# Patient Record
Sex: MALE | Race: WHITE | Employment: FULL TIME | URBAN - METROPOLITAN AREA
[De-identification: names, ages, dates, MRNs, and addresses within clinical notes are randomized per-mention and may not be internally consistent; named-entity substitution may affect disease eponyms.]

---

## 2017-06-28 ENCOUNTER — OFFICE VISIT (OUTPATIENT)
Dept: INTERNAL MEDICINE | Age: 32
End: 2017-06-28

## 2017-06-28 ENCOUNTER — HOSPITAL ENCOUNTER (OUTPATIENT)
Dept: CT IMAGING | Age: 32
Discharge: HOME | End: 2017-06-28
Attending: INTERNAL MEDICINE | Admitting: INTERNAL MEDICINE

## 2017-06-28 VITALS
WEIGHT: 270 LBS | BODY MASS INDEX: 33.57 KG/M2 | DIASTOLIC BLOOD PRESSURE: 80 MMHG | SYSTOLIC BLOOD PRESSURE: 144 MMHG | HEIGHT: 75 IN

## 2017-06-28 DIAGNOSIS — R51.9 ACUTE INTRACTABLE HEADACHE, UNSPECIFIED HEADACHE TYPE: ICD-10-CM

## 2017-06-28 DIAGNOSIS — R51.9 HEADACHE: ICD-10-CM

## 2017-06-28 DIAGNOSIS — R51.9 ACUTE INTRACTABLE HEADACHE, UNSPECIFIED HEADACHE TYPE: Primary | ICD-10-CM

## 2017-06-28 PROCEDURE — 99213 OFFICE O/P EST LOW 20 MIN: CPT | Performed by: INTERNAL MEDICINE

## 2017-06-28 RX ORDER — ONDANSETRON 4 MG/1
4 TABLET, FILM COATED ORAL DAILY PRN
Qty: 20 TABLET | Refills: 1 | Status: SHIPPED | OUTPATIENT
Start: 2017-06-28 | End: 2018-09-06 | Stop reason: ALTCHOICE

## 2017-06-28 RX ORDER — HYDROCODONE BITARTRATE AND ACETAMINOPHEN 5; 325 MG/1; MG/1
1 TABLET ORAL EVERY 6 HOURS PRN
Qty: 20 TABLET | Refills: 0 | Status: SHIPPED | OUTPATIENT
Start: 2017-06-28 | End: 2017-07-05

## 2017-06-28 RX ORDER — BUTALBITAL, ACETAMINOPHEN AND CAFFEINE 50; 325; 40 MG/1; MG/1; MG/1
1 TABLET ORAL EVERY 4 HOURS PRN
Qty: 30 TABLET | Refills: 0 | Status: SHIPPED | OUTPATIENT
Start: 2017-06-28 | End: 2018-09-06 | Stop reason: ALTCHOICE

## 2017-07-09 ASSESSMENT — ENCOUNTER SYMPTOMS
RESPIRATORY NEGATIVE: 1
ABDOMINAL PAIN: 0
SHORTNESS OF BREATH: 0
CHEST TIGHTNESS: 0

## 2018-09-06 ENCOUNTER — OFFICE VISIT (OUTPATIENT)
Dept: INTERNAL MEDICINE | Age: 33
End: 2018-09-06

## 2018-09-06 VITALS
WEIGHT: 275 LBS | BODY MASS INDEX: 34.19 KG/M2 | DIASTOLIC BLOOD PRESSURE: 70 MMHG | HEIGHT: 75 IN | SYSTOLIC BLOOD PRESSURE: 110 MMHG

## 2018-09-06 DIAGNOSIS — M54.50 ACUTE BILATERAL LOW BACK PAIN WITHOUT SCIATICA: Primary | ICD-10-CM

## 2018-09-06 DIAGNOSIS — F17.210 TOBACCO DEPENDENCE DUE TO CIGARETTES: ICD-10-CM

## 2018-09-06 PROCEDURE — 99213 OFFICE O/P EST LOW 20 MIN: CPT | Performed by: INTERNAL MEDICINE

## 2018-09-06 RX ORDER — VARENICLINE TARTRATE 25 MG
KIT ORAL
Qty: 1 EACH | Refills: 0 | Status: SHIPPED | OUTPATIENT
Start: 2018-09-06 | End: 2020-05-04

## 2018-09-06 RX ORDER — VARENICLINE TARTRATE 1 MG/1
1 TABLET, FILM COATED ORAL 2 TIMES DAILY
Qty: 60 TABLET | Refills: 3 | Status: SHIPPED | OUTPATIENT
Start: 2018-09-06 | End: 2020-05-04

## 2018-09-06 ASSESSMENT — ENCOUNTER SYMPTOMS
WHEEZING: 0
DIARRHEA: 0
CONSTIPATION: 0
SHORTNESS OF BREATH: 0
BACK PAIN: 1

## 2018-09-06 ASSESSMENT — PATIENT HEALTH QUESTIONNAIRE - PHQ9
SUM OF ALL RESPONSES TO PHQ QUESTIONS 1-9: 0
SUM OF ALL RESPONSES TO PHQ QUESTIONS 1-9: 0
1. LITTLE INTEREST OR PLEASURE IN DOING THINGS: 0
2. FEELING DOWN, DEPRESSED OR HOPELESS: 0
SUM OF ALL RESPONSES TO PHQ9 QUESTIONS 1 & 2: 0

## 2018-09-06 NOTE — PATIENT INSTRUCTIONS
Can take ibuprofen 600mg-800mg up to 3x/day. For the higher dose, limit to a couple weeks at most.     Patient Education        Low Back Pain: Exercises  Your Care Instructions  Here are some examples of typical rehabilitation exercises for your condition. Start each exercise slowly. Ease off the exercise if you start to have pain. Your doctor or physical therapist will tell you when you can start these exercises and which ones will work best for you. How to do the exercises  Press-up    1. Lie on your stomach, supporting your body with your forearms. 2. Press your elbows down into the floor to raise your upper back. As you do this, relax your stomach muscles and allow your back to arch without using your back muscles. As your press up, do not let your hips or pelvis come off the floor. 3. Hold for 15 to 30 seconds, then relax. 4. Repeat 2 to 4 times. Alternate arm and leg (bird dog) exercise    Do this exercise slowly. Try to keep your body straight at all times, and do not let one hip drop lower than the other. 1. Start on the floor, on your hands and knees. 2. Tighten your belly muscles. 3. Raise one leg off the floor, and hold it straight out behind you. Be careful not to let your hip drop down, because that will twist your trunk. 4. Hold for about 6 seconds, then lower your leg and switch to the other leg. 5. Repeat 8 to 12 times on each leg. 6. Over time, work up to holding for 10 to 30 seconds each time. 7. If you feel stable and secure with your leg raised, try raising the opposite arm straight out in front of you at the same time. Knee-to-chest exercise    1. Lie on your back with your knees bent and your feet flat on the floor. 2. Bring one knee to your chest, keeping the other foot flat on the floor (or keeping the other leg straight, whichever feels better on your lower back). 3. Keep your lower back pressed to the floor. Hold for at least 15 to 30 seconds.   4. Relax, and lower the knee

## 2019-04-24 ENCOUNTER — APPOINTMENT (OUTPATIENT)
Dept: ULTRASOUND IMAGING | Age: 34
End: 2019-04-24
Payer: COMMERCIAL

## 2019-04-24 ENCOUNTER — OFFICE VISIT (OUTPATIENT)
Dept: INTERNAL MEDICINE CLINIC | Age: 34
End: 2019-04-24
Payer: COMMERCIAL

## 2019-04-24 ENCOUNTER — HOSPITAL ENCOUNTER (EMERGENCY)
Age: 34
Discharge: HOME | End: 2019-04-24
Attending: EMERGENCY MEDICINE
Payer: COMMERCIAL

## 2019-04-24 VITALS
OXYGEN SATURATION: 98 % | WEIGHT: 280 LBS | RESPIRATION RATE: 20 BRPM | HEIGHT: 75 IN | BODY MASS INDEX: 34.82 KG/M2 | SYSTOLIC BLOOD PRESSURE: 130 MMHG | TEMPERATURE: 97.8 F | DIASTOLIC BLOOD PRESSURE: 79 MMHG | HEART RATE: 79 BPM

## 2019-04-24 VITALS
DIASTOLIC BLOOD PRESSURE: 90 MMHG | BODY MASS INDEX: 34.19 KG/M2 | HEIGHT: 75 IN | WEIGHT: 275 LBS | HEART RATE: 87 BPM | SYSTOLIC BLOOD PRESSURE: 144 MMHG | OXYGEN SATURATION: 97 %

## 2019-04-24 DIAGNOSIS — N50.3 EPIDIDYMAL CYST: Primary | ICD-10-CM

## 2019-04-24 DIAGNOSIS — N43.3 HYDROCELE, UNSPECIFIED HYDROCELE TYPE: ICD-10-CM

## 2019-04-24 DIAGNOSIS — N50.811 TESTICULAR PAIN, RIGHT: ICD-10-CM

## 2019-04-24 DIAGNOSIS — N50.811 RIGHT TESTICULAR PAIN: Primary | ICD-10-CM

## 2019-04-24 LAB
ANION GAP SERPL CALCULATED.3IONS-SCNC: 15 MMOL/L (ref 3–16)
BASOPHILS ABSOLUTE: 0 K/UL (ref 0–0.2)
BASOPHILS RELATIVE PERCENT: 0.3 %
BILIRUBIN URINE: NEGATIVE
BLOOD, URINE: NEGATIVE
BUN BLDV-MCNC: 9 MG/DL (ref 7–20)
CALCIUM SERPL-MCNC: 9.7 MG/DL (ref 8.3–10.6)
CHLORIDE BLD-SCNC: 100 MMOL/L (ref 99–110)
CLARITY: CLEAR
CO2: 24 MMOL/L (ref 21–32)
COLOR: YELLOW
CREAT SERPL-MCNC: 0.8 MG/DL (ref 0.9–1.3)
EOSINOPHILS ABSOLUTE: 0.6 K/UL (ref 0–0.6)
EOSINOPHILS RELATIVE PERCENT: 6.4 %
GFR AFRICAN AMERICAN: >60
GFR NON-AFRICAN AMERICAN: >60
GLUCOSE BLD-MCNC: 105 MG/DL (ref 70–99)
GLUCOSE URINE: NEGATIVE MG/DL
HCT VFR BLD CALC: 45.8 % (ref 40.5–52.5)
HEMOGLOBIN: 15.8 G/DL (ref 13.5–17.5)
KETONES, URINE: NEGATIVE MG/DL
LEUKOCYTE ESTERASE, URINE: NEGATIVE
LYMPHOCYTES ABSOLUTE: 2.4 K/UL (ref 1–5.1)
LYMPHOCYTES RELATIVE PERCENT: 25.5 %
MCH RBC QN AUTO: 31.1 PG (ref 26–34)
MCHC RBC AUTO-ENTMCNC: 34.5 G/DL (ref 31–36)
MCV RBC AUTO: 90.2 FL (ref 80–100)
MICROSCOPIC EXAMINATION: NORMAL
MONOCYTES ABSOLUTE: 0.6 K/UL (ref 0–1.3)
MONOCYTES RELATIVE PERCENT: 6.5 %
NEUTROPHILS ABSOLUTE: 5.8 K/UL (ref 1.7–7.7)
NEUTROPHILS RELATIVE PERCENT: 61.3 %
NITRITE, URINE: NEGATIVE
PDW BLD-RTO: 12.6 % (ref 12.4–15.4)
PH UA: 6.5 (ref 5–8)
PLATELET # BLD: 250 K/UL (ref 135–450)
PMV BLD AUTO: 9.2 FL (ref 5–10.5)
POTASSIUM REFLEX MAGNESIUM: 4.3 MMOL/L (ref 3.5–5.1)
PROTEIN UA: NEGATIVE MG/DL
RBC # BLD: 5.08 M/UL (ref 4.2–5.9)
SODIUM BLD-SCNC: 139 MMOL/L (ref 136–145)
SPECIFIC GRAVITY UA: <=1.005 (ref 1–1.03)
URINE TYPE: NORMAL
UROBILINOGEN, URINE: 0.2 E.U./DL
WBC # BLD: 9.5 K/UL (ref 4–11)

## 2019-04-24 PROCEDURE — 96374 THER/PROPH/DIAG INJ IV PUSH: CPT

## 2019-04-24 PROCEDURE — 81003 URINALYSIS AUTO W/O SCOPE: CPT

## 2019-04-24 PROCEDURE — 85025 COMPLETE CBC W/AUTO DIFF WBC: CPT

## 2019-04-24 PROCEDURE — 93975 VASCULAR STUDY: CPT

## 2019-04-24 PROCEDURE — 6360000002 HC RX W HCPCS: Performed by: PHYSICIAN ASSISTANT

## 2019-04-24 PROCEDURE — 99284 EMERGENCY DEPT VISIT MOD MDM: CPT

## 2019-04-24 PROCEDURE — 80048 BASIC METABOLIC PNL TOTAL CA: CPT

## 2019-04-24 PROCEDURE — 76870 US EXAM SCROTUM: CPT

## 2019-04-24 PROCEDURE — 96375 TX/PRO/DX INJ NEW DRUG ADDON: CPT

## 2019-04-24 PROCEDURE — 99213 OFFICE O/P EST LOW 20 MIN: CPT | Performed by: NURSE PRACTITIONER

## 2019-04-24 PROCEDURE — 6360000002 HC RX W HCPCS

## 2019-04-24 PROCEDURE — 36415 COLL VENOUS BLD VENIPUNCTURE: CPT

## 2019-04-24 RX ORDER — FENTANYL CITRATE 50 UG/ML
50 INJECTION, SOLUTION INTRAMUSCULAR; INTRAVENOUS ONCE
Status: COMPLETED | OUTPATIENT
Start: 2019-04-24 | End: 2019-04-24

## 2019-04-24 RX ORDER — IBUPROFEN 800 MG/1
800 TABLET ORAL EVERY 8 HOURS PRN
Qty: 30 TABLET | Refills: 0 | Status: SHIPPED | OUTPATIENT
Start: 2019-04-24

## 2019-04-24 RX ORDER — ONDANSETRON 2 MG/ML
4 INJECTION INTRAMUSCULAR; INTRAVENOUS ONCE
Status: DISCONTINUED | OUTPATIENT
Start: 2019-04-24 | End: 2019-04-24

## 2019-04-24 RX ORDER — FENTANYL CITRATE 50 UG/ML
INJECTION, SOLUTION INTRAMUSCULAR; INTRAVENOUS
Status: COMPLETED
Start: 2019-04-24 | End: 2019-04-24

## 2019-04-24 RX ORDER — KETOROLAC TROMETHAMINE 30 MG/ML
15 INJECTION, SOLUTION INTRAMUSCULAR; INTRAVENOUS ONCE
Status: COMPLETED | OUTPATIENT
Start: 2019-04-24 | End: 2019-04-24

## 2019-04-24 RX ORDER — HYDROCODONE BITARTRATE AND ACETAMINOPHEN 5; 325 MG/1; MG/1
1 TABLET ORAL EVERY 6 HOURS PRN
Qty: 10 TABLET | Refills: 0 | Status: SHIPPED | OUTPATIENT
Start: 2019-04-24 | End: 2019-04-27

## 2019-04-24 RX ADMIN — FENTANYL CITRATE 50 MCG: 50 INJECTION INTRAMUSCULAR; INTRAVENOUS at 11:51

## 2019-04-24 RX ADMIN — KETOROLAC TROMETHAMINE 15 MG: 30 INJECTION, SOLUTION INTRAMUSCULAR at 11:15

## 2019-04-24 RX ADMIN — FENTANYL CITRATE 50 MCG: 50 INJECTION, SOLUTION INTRAMUSCULAR; INTRAVENOUS at 11:51

## 2019-04-24 ASSESSMENT — PAIN DESCRIPTION - DESCRIPTORS: DESCRIPTORS: PRESSURE

## 2019-04-24 ASSESSMENT — PAIN DESCRIPTION - PAIN TYPE: TYPE: ACUTE PAIN

## 2019-04-24 ASSESSMENT — ENCOUNTER SYMPTOMS
SHORTNESS OF BREATH: 0
WHEEZING: 0
DIARRHEA: 0
COUGH: 0
ABDOMINAL PAIN: 0
VOMITING: 0
CHEST TIGHTNESS: 0
WHEEZING: 0
NAUSEA: 0
BACK PAIN: 0
COUGH: 0
FACIAL SWELLING: 0
SORE THROAT: 0
TROUBLE SWALLOWING: 0
SHORTNESS OF BREATH: 0

## 2019-04-24 ASSESSMENT — PAIN SCALES - GENERAL
PAINLEVEL_OUTOF10: 8
PAINLEVEL_OUTOF10: 8
PAINLEVEL_OUTOF10: 4
PAINLEVEL_OUTOF10: 8

## 2019-04-24 ASSESSMENT — PAIN DESCRIPTION - FREQUENCY: FREQUENCY: CONTINUOUS

## 2019-04-24 ASSESSMENT — PAIN DESCRIPTION - PROGRESSION: CLINICAL_PROGRESSION: GRADUALLY WORSENING

## 2019-04-24 ASSESSMENT — PAIN DESCRIPTION - LOCATION: LOCATION: ABDOMEN

## 2019-04-24 ASSESSMENT — PAIN DESCRIPTION - ORIENTATION: ORIENTATION: RIGHT

## 2019-04-24 NOTE — ASSESSMENT & PLAN NOTE
Pain started yesterday afternoon  Pain is affecting gait   Right teste is swollen and tender   Referred to ED

## 2019-04-24 NOTE — ED PROVIDER NOTES
surgery (1999). His family history includes Diabetes in his maternal grandmother. He reports that he has been smoking cigarettes. He has been smoking about 1.00 pack per day. He has quit using smokeless tobacco. His smokeless tobacco use included chew. He reports that he drinks alcohol. He reports that he does not use drugs. Medications     Previous Medications    VARENICLINE (CHANTIX CONTINUING MONTH PAK) 1 MG TABLET    Take 1 tablet by mouth 2 times daily    VARENICLINE (CHANTIX STARTING MONTH PAK) 0.5 MG X 11 & 1 MG X 42 TABLET    Take by mouth. Allergies     He is allergic to morphine. Physical Exam     INITIAL VITALS: BP: (!) 149/93, Temp: 97.8 °F (36.6 °C), Pulse: 87, Resp: 18, SpO2: 98 %  Physical Exam   Constitutional: He is oriented to person, place, and time. He appears well-developed and well-nourished. No distress. HENT:   Head: Normocephalic and atraumatic. Right Ear: External ear normal.   Left Ear: External ear normal.   Nose: Nose normal.   Mouth/Throat: Oropharynx is clear and moist.   Eyes: Conjunctivae are normal.   Neck: Normal range of motion. Neck supple. No tracheal deviation present. Cardiovascular: Normal rate, regular rhythm, normal heart sounds and intact distal pulses. Pulmonary/Chest: Effort normal and breath sounds normal. He exhibits no tenderness. Abdominal: Soft. Bowel sounds are normal. He exhibits no distension and no mass. There is no tenderness. There is no rebound, no guarding and no CVA tenderness. No hernia. Hernia confirmed negative in the right inguinal area and confirmed negative in the left inguinal area. Genitourinary: Penis normal. Right testis shows swelling and tenderness. Right testis shows no mass. Left testis shows no mass, no swelling and no tenderness. Circumcised. No penile erythema or penile tenderness. No discharge found. Genitourinary Comments: Right testicle swelling compared to the left.   He does have some tenderness over the epididymis. No other masses palpated. Musculoskeletal: Normal range of motion. He exhibits no edema. Lymphadenopathy:     He has no cervical adenopathy. No inguinal adenopathy noted on the right or left side. Neurological: He is alert and oriented to person, place, and time. Skin: Skin is warm and dry. No rash noted. He is not diaphoretic. Nursing note and vitals reviewed. Diagnostic Results     RADIOLOGY:  US SCROTUM AND TESTICLES   Final Result      Small minimally complex hydroceles bilaterally greater on the right. 7 mm epididymal cyst on the right. US DUP ABD PEL RETRO SCROT COMPLETE   Final Result      Small minimally complex hydroceles bilaterally greater on the right. 7 mm epididymal cyst on the right.           LABS:   Results for orders placed or performed during the hospital encounter of 04/24/19   Urinalysis, reflex to microscopic (Lab)   Result Value Ref Range    Color, UA Yellow Straw/Yellow    Clarity, UA Clear Clear    Glucose, Ur Negative Negative mg/dL    Bilirubin Urine Negative Negative    Ketones, Urine Negative Negative mg/dL    Specific Gravity, UA <=1.005 1.005 - 1.030    Blood, Urine Negative Negative    pH, UA 6.5 5.0 - 8.0    Protein, UA Negative Negative mg/dL    Urobilinogen, Urine 0.2 <2.0 E.U./dL    Nitrite, Urine Negative Negative    Leukocyte Esterase, Urine Negative Negative    Microscopic Examination Not Indicated     Urine Type Not Specified    CBC Auto Differential   Result Value Ref Range    WBC 9.5 4.0 - 11.0 K/uL    RBC 5.08 4.20 - 5.90 M/uL    Hemoglobin 15.8 13.5 - 17.5 g/dL    Hematocrit 45.8 40.5 - 52.5 %    MCV 90.2 80.0 - 100.0 fL    MCH 31.1 26.0 - 34.0 pg    MCHC 34.5 31.0 - 36.0 g/dL    RDW 12.6 12.4 - 15.4 %    Platelets 615 653 - 577 K/uL    MPV 9.2 5.0 - 10.5 fL    Neutrophils % 61.3 %    Lymphocytes % 25.5 %    Monocytes % 6.5 %    Eosinophils % 6.4 %    Basophils % 0.3 %    Neutrophils # 5.8 1.7 - 7.7 K/uL    Lymphocytes # 2.4 1.0 - is within normal limits with glucose of 105 and creatinine 0.8. Urinalysis was negative. Scrotal and testicle ultrasound showed small minimally complex hydroceles bilaterally greater on the right. He also has a 7 mm epididymal cyst on the right. Normal flow to both testicles. Patient will be referred to urology. He'll be prescribed ibuprofen 800 as well as a Norco for pain. He is to do scrotal support. If increased pain, nausea, vomiting, fevers, abdominal pain or back pain or thinks symptoms [facility] emergency department. At this point is stable for discharge. This patient was also evaluated by the attending physician. All care plans were discussed and agreed upon. Clinical Impression     1. Epididymal cyst    2. Hydrocele, unspecified hydrocele type        Disposition     PATIENT REFERRED TO:  [facility] Emergency Department  [address] [facility]    If symptoms worsen    Alex Hi MD  [address] [address] [facility]          Ruddy Aj  [facility]  [address] [place] [address]  [phone]    Schedule an appointment as soon as possible for a visit   with urology      DISCHARGE MEDICATIONS:  New Prescriptions    HYDROCODONE-ACETAMINOPHEN (NORCO) 5-325 MG PER TABLET    Take 1 tablet by mouth every 6 hours as needed for Pain for up to 3 days.     IBUPROFEN (ADVIL;MOTRIN) 800 MG TABLET    Take 1 tablet by mouth every 8 hours as needed for Pain (Take with food)       DISPOSITION   discharged     [place]  04/24/19 1320

## 2019-04-24 NOTE — PROGRESS NOTES
Subjective:      Patient ID: Zeinab Charlton is a 29 y.o. male. Chief Complaint   Patient presents with    Groin Pain     started yesterday afternoon. HPI   Shailesh Alicea is here for right groin pain and scrotal pain that started yesterday afternoon. He states his testicular pain is worse than his groin pain. He has not tried taking anything for his pain. Pain is gradually worsening. Pain is making it difficult to walk. Review of Systems   Constitutional: Negative for chills, fatigue and fever. Respiratory: Negative for cough, shortness of breath and wheezing. Cardiovascular: Negative for chest pain and palpitations. Genitourinary: Positive for scrotal swelling and testicular pain. Negative for discharge, dysuria, frequency, penile pain and urgency. Musculoskeletal: Negative for arthralgias and myalgias. Neurological: Negative for dizziness, weakness, light-headedness and headaches. Hematological: Negative for adenopathy. Objective:   Physical Exam   Constitutional: He is oriented to person, place, and time. He appears well-developed and well-nourished. HENT:   Head: Normocephalic and atraumatic. Cardiovascular: Normal rate, regular rhythm and normal heart sounds. Pulmonary/Chest: Effort normal and breath sounds normal.   Abdominal: Hernia confirmed negative in the right inguinal area and confirmed negative in the left inguinal area. Genitourinary: Penis normal. Right testis shows swelling and tenderness. Right testis shows no mass. Left testis shows no mass, no swelling and no tenderness. Musculoskeletal: Normal range of motion. Neurological: He is alert and oriented to person, place, and time. Skin: Skin is warm and dry. Capillary refill takes less than 2 seconds. Psychiatric: He has a normal mood and affect.  His behavior is normal.       Assessment:      See Problem List assessment and plan       Plan:       Testicular pain, right  Pain started yesterday afternoon  Pain is affecting gait   Right teste is swollen and tender   Referred to ED        Patient engaged in shared decision making. Information given to evaluate options of treatment, understand what is needed and discuss importance of following plan.

## 2019-04-24 NOTE — PATIENT INSTRUCTIONS
Patient Education        Groin Strain: Care Instructions  Your Care Instructions  A groin strain is an injury that happens when you tear or overstretch (pull) a groin muscle. The groin muscles are in the area on either side of the body in the folds where the belly joins the legs. You can strain a groin muscle during exercise, such as running, skating, kicking in soccer, or playing basketball. It can happen when you lift, push, or pull heavy objects. You might pull a groin muscle when you fall. The injury can range from a minor pull to a more serious tear of the muscle. You may feel pain and tenderness that's worse when you squeeze your legs together. You may also have pain when you raise the knee of the injured side. There may be swelling or bruising in the groin area or inner thigh. If you have a bad strain, you may walk with a limp while it heals. Rest and other home care can help the muscle heal. Healing can take up to 3 weeks or more. Your doctor may want to see you again in 2 to 3 weeks. Follow-up care is a key part of your treatment and safety. Be sure to make and go to all appointments, and call your doctor if you are having problems. It's also a good idea to know your test results and keep a list of the medicines you take. How can you care for yourself at home? · Be safe with medicines. Read and follow all instructions on the label. ? If the doctor gave you a prescription medicine for pain, take it as prescribed. ? If you are not taking a prescription pain medicine, ask your doctor if you can take an over-the-counter medicine. · Rest and protect your injured or sore groin area for 1 to 2 weeks. Stop, change, or take a break from any activity that may be causing your pain or soreness. Do not do intense activities while you still have pain. · Put ice or a cold pack on your groin area for 10 to 20 minutes at a time.  Try to do this every 1 to 2 hours for the next 3 days (when you are awake) or until the swelling goes down. Put a thin cloth between the ice and your skin. · After 2 or 3 days, if your swelling is gone, apply heat. Put a warm water bottle, a heating pad set on low, or a warm cloth on your groin area. Do not go to sleep with a heating pad on your skin. · If your doctor gave you crutches, make sure you use them as directed. · Wear snug shorts or underwear that support the injured area. When should you call for help? Call your doctor now or seek immediate medical care if:    · You have new or severe pain or swelling in the groin area.     · Your groin or upper thigh is cool or pale or changes color.     · You have tingling, weakness, or numbness in your groin or leg.     · You cannot move your leg.     · You cannot put weight on your leg.    Watch closely for changes in your health, and be sure to contact your doctor if:    · You do not get better as expected. Where can you learn more? Go to [email or web address] org and sign in to your [employer] account. Enter R702 in the [employer] box to learn more about \"Groin Strain: Care Instructions. \"     If you do not have an account, please click on the \"Sign Up Now\" link. Current as of: September 20, 2018  Content Version: 11.9  © 5100-6216 [employer]. Care instructions adapted under license by [facility] ([facility]). If you have questions about a medical condition or this instruction, always ask your healthcare professional. [place] [address] any warranty or liability for your use of this information.

## 2019-04-24 NOTE — ED PROVIDER NOTES
ED Attending Attestation Note     Date of evaluation: 4/24/2019    This patient was seen by the advance practice provider. I have seen and examined the patient, agree with the workup, evaluation, management and diagnosis. The care plan has been discussed. My assessment reveals a well appearing 29year old male with a benign abdominal examination. On my evaluation he has no significant swelling or discoloration to his testicles. No evidence of hernia. Some mild tenderness to his right testicle. I have low suspicion for torsion. Sexually active with no discharge. Same sexual partner for the past 8 years. Will obtain US to evaluate.        Lucia Rodriguez MD  04/24/19 9123

## 2019-04-29 ENCOUNTER — TELEPHONE (OUTPATIENT)
Dept: INTERNAL MEDICINE CLINIC | Age: 34
End: 2019-04-29

## 2019-04-29 ENCOUNTER — OFFICE VISIT (OUTPATIENT)
Dept: INTERNAL MEDICINE CLINIC | Age: 34
End: 2019-04-29
Payer: COMMERCIAL

## 2019-04-29 VITALS
HEIGHT: 75 IN | DIASTOLIC BLOOD PRESSURE: 70 MMHG | WEIGHT: 280 LBS | SYSTOLIC BLOOD PRESSURE: 138 MMHG | BODY MASS INDEX: 34.82 KG/M2

## 2019-04-29 DIAGNOSIS — R10.31 SEVERE RIGHT GROIN PAIN: Primary | ICD-10-CM

## 2019-04-29 PROCEDURE — 99213 OFFICE O/P EST LOW 20 MIN: CPT | Performed by: INTERNAL MEDICINE

## 2019-04-29 RX ORDER — HYDROCODONE BITARTRATE AND ACETAMINOPHEN 5; 325 MG/1; MG/1
1 TABLET ORAL EVERY 6 HOURS PRN
Qty: 28 TABLET | Refills: 0 | Status: SHIPPED | OUTPATIENT
Start: 2019-04-29 | End: 2019-05-06

## 2019-04-29 ASSESSMENT — PATIENT HEALTH QUESTIONNAIRE - PHQ9
SUM OF ALL RESPONSES TO PHQ9 QUESTIONS 1 & 2: 0
SUM OF ALL RESPONSES TO PHQ QUESTIONS 1-9: 0
1. LITTLE INTEREST OR PLEASURE IN DOING THINGS: 0
SUM OF ALL RESPONSES TO PHQ QUESTIONS 1-9: 0
2. FEELING DOWN, DEPRESSED OR HOPELESS: 0

## 2019-04-29 NOTE — PROGRESS NOTES
2019     Susan Polanco (:  1985) is a 29 y.o. male, here for evaluation of the following medical concerns:    Chief Complaint   Patient presents with    Groin Pain     onset 1 week, saw NP upstairs that sent him to ER, stated two cysts        HPI    Right groin pain - Started last week. Friday started feeling better, then was more active and started to get worse. Thinks when his heart rate is high pain is worse. Better Saturday, worse on , no clear pattern. No activity that seems to exacerbate the symptoms. Swelling is about the same. Taking ibuprofen and Vicodin, ibuprofen doesn't seem to do anything and Vicodin only seems to help a little. No bowel changes. No fevers/chills, redness. ED workup was unremarkable, included CBC, testicular ultrasound with doppler. Only found small epididymal cysts. Review of Systems   Genitourinary: Positive for testicular pain. Prior to Visit Medications    Medication Sig Taking? Authorizing Provider   HYDROcodone-acetaminophen (NORCO) 5-325 MG per tablet Take 1 tablet by mouth every 6 hours as needed for Pain for up to 7 days. Intended supply: 7 days. Take lowest dose possible to manage pain Yes Alice Ochoa MD   ibuprofen (ADVIL;MOTRIN) 800 MG tablet Take 1 tablet by mouth every 8 hours as needed for Pain (Take with food) Yes KATLYN Schmidt   varenicline (CHANTIX STARTING MONTH ROSIE) 0.5 MG X 11 & 1 MG X 42 tablet Take by mouth.  Yes Alice Ochoa MD   varenicline (CHANTIX CONTINUING MONTH ROSIE) 1 MG tablet Take 1 tablet by mouth 2 times daily Yes Alice Ochoa MD        Past Medical History:   Diagnosis Date    Migraine headache with aura        Past Surgical History:   Procedure Laterality Date    HAND SURGERY  Crystal 10, 2011    right hand -- industrial injury --  Dr. Britany Balbuena ARTHROSCOPY  2008    right knee - Dr. 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 - [facility]       Social History     Tobacco Use    Smoking status: Current Every Day Smoker     Packs/day: 1.00     Types: Cigarettes    Smokeless tobacco: Former User     Types: Chew    Tobacco comment: started smoking when he was age 13  ---- last chewed tobacco age 17   Substance Use Topics    Alcohol use: Yes     Comment: social -- couple of beers per week        Family History   Problem Relation Age of Onset    Diabetes Maternal Grandmother        Vitals:    04/29/19 1415   BP: 138/70   Site: Right Upper Arm   Weight: 280 lb (127 kg)   Height: 6' 3\" (1.905 m)     Estimated body mass index is 35 kg/m² as calculated from the following:    Height as of this encounter: 6' 3\" (1.905 m). Weight as of this encounter: 280 lb (127 kg). Physical Exam   Constitutional: He appears well-developed and well-nourished. No distress. Appears uncomfortable   Abdominal: Hernia confirmed negative in the right inguinal area. Genitourinary: Right testis shows swelling and tenderness. Right testis shows no mass. Left testis shows no tenderness. Genitourinary Comments: No erythema noted of the scrotum   Lymphadenopathy: No inguinal adenopathy noted on the right side. Skin: Skin is warm and dry. Vitals reviewed. ASSESSMENT/PLAN:  1. Severe right groin pain  - Unclear etiology. Evaluation thus far has been normal.  At this point I do think that he needs to see urology, he is called the office but has not been successful in scheduling an acute appointment. He will try calling back. - For now will manage with NSAIDs and hydrocodone until evaluation with urology.  - HYDROcodone-acetaminophen (NORCO) 5-325 MG per tablet; Take 1 tablet by mouth every 6 hours as needed for Pain for up to 7 days. Intended supply: 7 days. Take lowest dose possible to manage pain  Dispense: 28 tablet; Refill: 0       Return if symptoms worsen or fail to improve.

## 2019-04-29 NOTE — TELEPHONE ENCOUNTER
Patient stated he is still having groin pain and wanted to see if he could get an appointment today. I offered patient with NP but patient declined stating he saw one last week and they sent him to the ER. Patient is having a lot of pain.      Please call to advise

## 2019-04-29 NOTE — LETTER
[facility] [address]  [address], [address] [place] [id number]  Phone: [phone]  Fax: [phone]    Christal Villegas MD        April 29, 2019     Patient: Oscar Pantoja   YOB: 1985   Date of Visit: 4/29/2019       To Whom It May Concern: It is my medical opinion that Oscar Pantoja may return to work with the following restrictions: lifting/carrying not to exceed 10 lbs. .    If you have any questions or concerns, please don't hesitate to call.     Sincerely,        Christal Villegas MD

## 2020-04-06 ENCOUNTER — VIRTUAL VISIT (OUTPATIENT)
Dept: INTERNAL MEDICINE CLINIC | Age: 35
End: 2020-04-06
Payer: COMMERCIAL

## 2020-04-06 VITALS — WEIGHT: 285 LBS | HEIGHT: 75 IN | BODY MASS INDEX: 35.43 KG/M2

## 2020-04-06 PROCEDURE — 99213 OFFICE O/P EST LOW 20 MIN: CPT | Performed by: INTERNAL MEDICINE

## 2020-04-06 RX ORDER — SULFAMETHOXAZOLE AND TRIMETHOPRIM 800; 160 MG/1; MG/1
1 TABLET ORAL 2 TIMES DAILY
Qty: 14 TABLET | Refills: 0 | Status: SHIPPED | OUTPATIENT
Start: 2020-04-06 | End: 2020-04-13

## 2020-04-06 SDOH — ECONOMIC STABILITY: INCOME INSECURITY: HOW HARD IS IT FOR YOU TO PAY FOR THE VERY BASICS LIKE FOOD, HOUSING, MEDICAL CARE, AND HEATING?: NOT HARD AT ALL

## 2020-04-06 SDOH — ECONOMIC STABILITY: FOOD INSECURITY: WITHIN THE PAST 12 MONTHS, THE FOOD YOU BOUGHT JUST DIDN'T LAST AND YOU DIDN'T HAVE MONEY TO GET MORE.: NEVER TRUE

## 2020-04-06 SDOH — ECONOMIC STABILITY: FOOD INSECURITY: WITHIN THE PAST 12 MONTHS, YOU WORRIED THAT YOUR FOOD WOULD RUN OUT BEFORE YOU GOT MONEY TO BUY MORE.: NEVER TRUE

## 2020-04-06 SDOH — ECONOMIC STABILITY: TRANSPORTATION INSECURITY
IN THE PAST 12 MONTHS, HAS LACK OF TRANSPORTATION KEPT YOU FROM MEETINGS, WORK, OR FROM GETTING THINGS NEEDED FOR DAILY LIVING?: NO

## 2020-04-06 SDOH — ECONOMIC STABILITY: TRANSPORTATION INSECURITY
IN THE PAST 12 MONTHS, HAS THE LACK OF TRANSPORTATION KEPT YOU FROM MEDICAL APPOINTMENTS OR FROM GETTING MEDICATIONS?: NO

## 2020-04-06 ASSESSMENT — PATIENT HEALTH QUESTIONNAIRE - PHQ9
SUM OF ALL RESPONSES TO PHQ QUESTIONS 1-9: 0
2. FEELING DOWN, DEPRESSED OR HOPELESS: 0
1. LITTLE INTEREST OR PLEASURE IN DOING THINGS: 0
SUM OF ALL RESPONSES TO PHQ9 QUESTIONS 1 & 2: 0
SUM OF ALL RESPONSES TO PHQ QUESTIONS 1-9: 0

## 2020-04-06 ASSESSMENT — ENCOUNTER SYMPTOMS: COLOR CHANGE: 1

## 2020-04-06 NOTE — PROGRESS NOTES
acute change so would defer management of this until after the covid-19 crisis is over. If requiring excision will need referral.      No follow-ups on file. Cande Mello is a 36 y.o. male being evaluated by a Virtual Visit (video visit) encounter to address concerns as mentioned above. A caregiver was present when appropriate. Due to this being a TeleHealth encounter (During YUNGE-99 public health emergency), evaluation of the following organ systems was limited: Vitals/Constitutional/EENT/Resp/CV/GI//MS/Neuro/Skin/Heme-Lymph-Imm. Pursuant to the emergency declaration under the [address] [place] [address], [address] [place] [address] authority and the [employer] Act, this Virtual Visit was conducted with patient's (and/or legal guardian's) consent, to reduce the patient's risk of exposure to COVID-19 and provide necessary medical care. The patient (and/or legal guardian) has also been advised to contact this office for worsening conditions or problems, and seek emergency medical treatment and/or call 911 if deemed necessary. Services were provided through a video synchronous discussion virtually to substitute for in-person clinic visit. Patient and provider were located at their individual homes. --Irina Victor MD on 4/6/2020 at 4:45 PM    An electronic signature was used to authenticate this note.

## 2020-05-04 ENCOUNTER — OFFICE VISIT (OUTPATIENT)
Dept: INTERNAL MEDICINE CLINIC | Age: 35
End: 2020-05-04
Payer: COMMERCIAL

## 2020-05-04 VITALS
BODY MASS INDEX: 36.06 KG/M2 | WEIGHT: 290 LBS | HEIGHT: 75 IN | TEMPERATURE: 97.6 F | SYSTOLIC BLOOD PRESSURE: 138 MMHG | DIASTOLIC BLOOD PRESSURE: 80 MMHG

## 2020-05-04 PROCEDURE — 99213 OFFICE O/P EST LOW 20 MIN: CPT | Performed by: INTERNAL MEDICINE

## 2020-05-04 NOTE — PROGRESS NOTES
2020     Loraine Hayward (:  1985) is a 36 y.o. male, here for evaluation of the following medical concerns:    Chief Complaint   Patient presents with    Back Pain     spot on left side that is painful, been there a month and a half,     Rib Injury     left side swelling and pain         HPI    Spot on the back continues to be painful. Bothers him more in the morning and less in the afternoon. Has been treated. Has not grown significantly since last month. He has noted pain and swelling of the lower left anterior chest wall no history of trauma. This has been going on for the last couple weeks. No discoloration or rash. Review of Systems   Skin:        +skin lesion       Prior to Visit Medications    Medication Sig Taking?  Authorizing Provider   ibuprofen (ADVIL;MOTRIN) 800 MG tablet Take 1 tablet by mouth every 8 hours as needed for Pain (Take with food) Yes KATLYN Mendez        Past Medical History:   Diagnosis Date    Migraine headache with aura        Past Surgical History:   Procedure Laterality Date    HAND SURGERY  [place] 10, 2011    right hand -- industrial injury --  Dr. Clarissa Browning ARTHROSCOPY  2008    right knee - Dr. Dwayne Cunningham    hemangioma [facility] from the forehead above the nose - [facility]       Social History     Tobacco Use    Smoking status: Current Every Day Smoker     Packs/day: 1.00     Types: Cigarettes    Smokeless tobacco: Former User     Types: Chew    Tobacco comment: started smoking when he was age 13  ---- last chewed tobacco age 17   Substance Use Topics    Alcohol use: Yes     Comment: social -- couple of beers per week        Family History   Problem Relation Age of Onset    Diabetes Maternal Grandmother        Vitals:    20 1104   BP: 138/80   Site: Left Upper Arm   Temp: 97.6 °F (36.4 °C)   Weight: 290 lb (131.5 kg)   Height: 6' 3\" (1.905 m)     Estimated body

## 2020-05-05 ENCOUNTER — HOSPITAL ENCOUNTER (OUTPATIENT)
Dept: ULTRASOUND IMAGING | Age: 35
Discharge: HOME | End: 2020-05-05
Payer: COMMERCIAL

## 2020-05-05 PROCEDURE — 76705 ECHO EXAM OF ABDOMEN: CPT

## 2020-05-05 ASSESSMENT — ENCOUNTER SYMPTOMS: ROS SKIN COMMENTS: +SKIN LESION

## 2020-08-06 ENCOUNTER — OFFICE VISIT (OUTPATIENT)
Dept: INTERNAL MEDICINE CLINIC | Age: 35
End: 2020-08-06
Payer: COMMERCIAL

## 2020-08-06 VITALS
SYSTOLIC BLOOD PRESSURE: 138 MMHG | BODY MASS INDEX: 36.22 KG/M2 | DIASTOLIC BLOOD PRESSURE: 80 MMHG | TEMPERATURE: 98 F | WEIGHT: 289.8 LBS

## 2020-08-06 PROCEDURE — 99214 OFFICE O/P EST MOD 30 MIN: CPT | Performed by: INTERNAL MEDICINE

## 2020-08-06 RX ORDER — DICLOFENAC SODIUM 75 MG/1
75 TABLET, DELAYED RELEASE ORAL 2 TIMES DAILY
Qty: 60 TABLET | Refills: 1 | Status: SHIPPED | OUTPATIENT
Start: 2020-08-06 | End: 2022-05-19 | Stop reason: CLARIF

## 2020-08-06 NOTE — PROGRESS NOTES
2020     Tiesha Jerome (:  1985) is a 30 y.o. male, here for evaluation of the following medical concerns:    Chief Complaint   Patient presents with    Abdominal Pain     since about March        HPI    Left side pain never went away completely got worse on Friday. No clear trigger. No heavy lifting. Intensity is worse, quality of pain is the same. Someitmes is a stabbing pain, constant pressure. Waking up at night. Nothing makes it better or worse. Ibuprofen will take the edge off. No skin changes or rashes. Pain is located over the lower left chest wall, extends to the side but not the back. Does not worsen with deep breath. Review of Systems   Cardiovascular: Positive for chest pain. Skin: Negative for rash. Prior to Visit Medications    Medication Sig Taking?  Authorizing Provider   diclofenac (VOLTAREN) 75 MG EC tablet Take 1 tablet by mouth 2 times daily Yes Marifer Springer MD   ibuprofen (ADVIL;MOTRIN) 800 MG tablet Take 1 tablet by mouth every 8 hours as needed for Pain (Take with food) Yes Sharyle Short, PA        Past Medical History:   Diagnosis Date    Migraine headache with aura        Past Surgical History:   Procedure Laterality Date    HAND SURGERY  [place]rystal 10, 2011    right hand -- industrial injury --  Dr. Honey Galeas ARTHROSCOPY  2008    right knee - Dr. 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 - [facility]       Social History     Tobacco Use    Smoking status: Current Every Day Smoker     Packs/day: 1.00     Types: Cigarettes    Smokeless tobacco: Former User     Types: Chew    Tobacco comment: started smoking when he was age 13  ---- last chewed tobacco age 17   Substance Use Topics    Alcohol use: Yes     Comment: social -- couple of beers per week        Family History   Problem Relation Age of Onset    Diabetes Maternal Grandmother        Vitals:    20 1539 BP: 138/80   Site: Left Upper Arm   Position: Sitting   Cuff Size: Large Adult   Temp: 98 °F (36.7 °C)   TempSrc: Oral   Weight: 289 lb 12.8 oz (131.5 kg)     Estimated body mass index is 36.22 kg/m² as calculated from the following:    Height as of 5/4/20: 6' 3\" (1.905 m). Weight as of this encounter: 289 lb 12.8 oz (131.5 kg). Physical Exam  Vitals signs reviewed. Constitutional:       General: He is not in acute distress. Appearance: He is well-developed. HENT:      Head: Normocephalic and atraumatic. Cardiovascular:      Rate and Rhythm: Normal rate and regular rhythm. Heart sounds: Normal heart sounds. Pulmonary:      Effort: Pulmonary effort is normal. No respiratory distress. Breath sounds: Normal breath sounds. Chest:          Comments: Left lower chest wall tenderness  Skin:     General: Skin is warm and dry. Neurological:      General: No focal deficit present. Mental Status: He is alert. Psychiatric:         Behavior: Behavior normal.         Thought Content: Thought content normal.         Judgment: Judgment normal.         ASSESSMENT/PLAN:  1. Left-sided chest wall pain  - unclear etiology. Ultrasound was normal. Not entirely consistent with costochondritis. Could be from the back, however he is not experiencing back pain, pain is only anterior.   - check CXR, if normal would check CT  - diclofenac 75mg BID  - XR CHEST (2 VW); Future      Return if symptoms worsen or fail to improve.

## 2020-08-07 ENCOUNTER — HOSPITAL ENCOUNTER (OUTPATIENT)
Age: 35
Discharge: HOME | End: 2020-08-07
Payer: COMMERCIAL

## 2020-08-07 ENCOUNTER — HOSPITAL ENCOUNTER (OUTPATIENT)
Dept: GENERAL RADIOLOGY | Age: 35
Discharge: HOME | End: 2020-08-07
Payer: COMMERCIAL

## 2020-08-07 PROCEDURE — 71046 X-RAY EXAM CHEST 2 VIEWS: CPT

## 2020-08-17 ENCOUNTER — HOSPITAL ENCOUNTER (OUTPATIENT)
Dept: CT IMAGING | Age: 35
Discharge: HOME | End: 2020-08-17
Payer: COMMERCIAL

## 2020-08-17 PROCEDURE — 71250 CT THORAX DX C-: CPT

## 2022-05-19 ENCOUNTER — HOSPITAL ENCOUNTER (OUTPATIENT)
Age: 37
Discharge: HOME | End: 2022-05-19
Payer: COMMERCIAL

## 2022-05-19 ENCOUNTER — HOSPITAL ENCOUNTER (OUTPATIENT)
Dept: GENERAL RADIOLOGY | Age: 37
Discharge: HOME | End: 2022-05-19
Payer: COMMERCIAL

## 2022-05-19 ENCOUNTER — OFFICE VISIT (OUTPATIENT)
Dept: INTERNAL MEDICINE CLINIC | Age: 37
End: 2022-05-19
Payer: COMMERCIAL

## 2022-05-19 VITALS
HEIGHT: 75 IN | BODY MASS INDEX: 38.17 KG/M2 | DIASTOLIC BLOOD PRESSURE: 92 MMHG | SYSTOLIC BLOOD PRESSURE: 130 MMHG | WEIGHT: 307 LBS

## 2022-05-19 DIAGNOSIS — K21.9 GASTROESOPHAGEAL REFLUX DISEASE WITHOUT ESOPHAGITIS: ICD-10-CM

## 2022-05-19 DIAGNOSIS — R09.1 PLEURISY: ICD-10-CM

## 2022-05-19 DIAGNOSIS — R09.1 PLEURISY: Primary | ICD-10-CM

## 2022-05-19 PROCEDURE — 99214 OFFICE O/P EST MOD 30 MIN: CPT | Performed by: HOSPITALIST

## 2022-05-19 PROCEDURE — 71046 X-RAY EXAM CHEST 2 VIEWS: CPT

## 2022-05-19 RX ORDER — ESOMEPRAZOLE MAGNESIUM 40 MG/1
40 CAPSULE, DELAYED RELEASE ORAL DAILY
Qty: 30 CAPSULE | Refills: 3 | Status: SHIPPED | OUTPATIENT
Start: 2022-05-19

## 2022-05-19 SDOH — ECONOMIC STABILITY: FOOD INSECURITY: WITHIN THE PAST 12 MONTHS, THE FOOD YOU BOUGHT JUST DIDN'T LAST AND YOU DIDN'T HAVE MONEY TO GET MORE.: NEVER TRUE

## 2022-05-19 SDOH — ECONOMIC STABILITY: FOOD INSECURITY: WITHIN THE PAST 12 MONTHS, YOU WORRIED THAT YOUR FOOD WOULD RUN OUT BEFORE YOU GOT MONEY TO BUY MORE.: NEVER TRUE

## 2022-05-19 ASSESSMENT — PATIENT HEALTH QUESTIONNAIRE - PHQ9
SUM OF ALL RESPONSES TO PHQ9 QUESTIONS 1 & 2: 0
1. LITTLE INTEREST OR PLEASURE IN DOING THINGS: 0
SUM OF ALL RESPONSES TO PHQ QUESTIONS 1-9: 0
2. FEELING DOWN, DEPRESSED OR HOPELESS: 0

## 2022-05-19 ASSESSMENT — SOCIAL DETERMINANTS OF HEALTH (SDOH): HOW HARD IS IT FOR YOU TO PAY FOR THE VERY BASICS LIKE FOOD, HOUSING, MEDICAL CARE, AND HEATING?: NOT HARD AT ALL

## 2022-05-19 NOTE — PROGRESS NOTES
Follow Up Visit Established Patient Visit    Patient:  Kamilah Sarkar                                               : 1985  Age: 29 y.o. MRN: [id number]  Date : 2022    Kamilah Sarkar is a 29 y.o. male who presents for : Evaluation of left upper back area pain last 5 days. Chief Complaint   Patient presents with    Back Pain     Pain is intermittent. It is worse with deep inspiration. Was taking some ibuprofen as needed without symptom relief. Also reports some chest tightness. Has daily, at least moderate severity, heartburn. Takes Tums as needed with partial symptom relief  No fever/chills. No signs of upper respiratory infection/bronchitis. Does not have family history of premature coronary artery disease. Was as a . Does not smoke. Does not consume excessive amount of alcohol    Past Medical History:   Diagnosis Date    Migraine headache with aura        Past Surgical History:   Procedure Laterality Date    HAND SURGERY  [place] 10, 2011    right hand -- industrial injury --  Dr. Nina Willoughby ARTHROSCOPY  2008    right knee - Dr. 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 BP (!) 130/92   Ht 6' 3\" (1.905 m)   Wt (!) 307 lb (139.3 kg)   BMI 38.37 kg/m²     Physical Exam   Physical Exam  Vitals and nursing note reviewed. Constitutional:       General: He is not in acute distress. Appearance: Normal appearance. He is well-developed. He is not ill-appearing. HENT:      Head: Normocephalic and atraumatic. Mouth/Throat:      Mouth: Mucous membranes are moist.      Pharynx: Oropharynx is clear. No oropharyngeal exudate. Eyes:      General: No scleral icterus. Pupils: Pupils are equal, round, and reactive to light. Neck:      Vascular: No carotid bruit or JVD. Cardiovascular:      Rate and Rhythm: Normal rate and regular rhythm. Heart sounds: Normal heart sounds. No murmur heard. No friction rub. No gallop. Pulmonary:      Effort: Pulmonary effort is normal. No respiratory distress. Breath sounds: Normal breath sounds. No wheezing or rales. Abdominal:      General: Bowel sounds are normal. There is no distension. Palpations: Abdomen is soft. Tenderness: There is no abdominal tenderness. There is no right CVA tenderness or left CVA tenderness. Musculoskeletal:         General: No tenderness (Left upper back tenderness to palpation, around T4 level on mid scapular line). Normal range of motion. Cervical back: Normal range of motion and neck supple. Right lower leg: No edema. Left lower leg: No edema. Lymphadenopathy:      Cervical: No cervical adenopathy. Skin:     General: Skin is warm and dry. Capillary Refill: Capillary refill takes less than 2 seconds. Findings: No erythema, lesion or rash. Neurological:      General: No focal deficit present. Mental Status: He is alert and oriented to person, place, and time. Cranial Nerves: No cranial nerve deficit. Psychiatric:         Mood and Affect: Mood normal.         Assessment/ plan:     Derek Thornton was seen today for back pain. Diagnoses and all orders for this visit:    Pleurisy  -     XR CHEST (2 VW); Future  -     diclofenac (VOLTAREN) 50 MG EC tablet;  Take 1 tablet by mouth 2 times daily as needed for Pain    Gastroesophageal reflux disease without esophagitis  - esomeprazole (NEXIUM) 40 MG delayed release capsule; Take 1 capsule by mouth daily        Return in about 1 month (around 6/19/2022), or if symptoms worsen or fail to improve, for GERD.     Shira Jose MD